# Patient Record
Sex: FEMALE | Race: WHITE | NOT HISPANIC OR LATINO | Employment: FULL TIME | URBAN - NONMETROPOLITAN AREA
[De-identification: names, ages, dates, MRNs, and addresses within clinical notes are randomized per-mention and may not be internally consistent; named-entity substitution may affect disease eponyms.]

---

## 2019-02-01 ENCOUNTER — NON-PROVIDER VISIT (OUTPATIENT)
Dept: URGENT CARE | Facility: PHYSICIAN GROUP | Age: 29
End: 2019-02-01
Payer: OTHER GOVERNMENT

## 2019-02-01 DIAGNOSIS — Z02.1 PRE-EMPLOYMENT DRUG SCREENING: ICD-10-CM

## 2019-02-01 PROCEDURE — 7503 PR ESCREEN ACCT UDS COL ONLY: Performed by: PHYSICIAN ASSISTANT

## 2019-06-13 ENCOUNTER — OFFICE VISIT (OUTPATIENT)
Dept: URGENT CARE | Facility: PHYSICIAN GROUP | Age: 29
End: 2019-06-13
Payer: OTHER GOVERNMENT

## 2019-06-13 VITALS
SYSTOLIC BLOOD PRESSURE: 122 MMHG | OXYGEN SATURATION: 97 % | WEIGHT: 220 LBS | TEMPERATURE: 98.2 F | HEART RATE: 92 BPM | BODY MASS INDEX: 33.34 KG/M2 | DIASTOLIC BLOOD PRESSURE: 70 MMHG | HEIGHT: 68 IN | RESPIRATION RATE: 16 BRPM

## 2019-06-13 DIAGNOSIS — L30.9 ECZEMA, UNSPECIFIED TYPE: ICD-10-CM

## 2019-06-13 DIAGNOSIS — E66.9 OBESITY (BMI 30-39.9): ICD-10-CM

## 2019-06-13 PROCEDURE — 99204 OFFICE O/P NEW MOD 45 MIN: CPT | Performed by: PHYSICIAN ASSISTANT

## 2019-06-13 RX ORDER — TRIAMCINOLONE ACETONIDE 1 MG/G
1 CREAM TOPICAL 2 TIMES DAILY
Qty: 1 TUBE | Refills: 0 | Status: SHIPPED | OUTPATIENT
Start: 2019-06-13

## 2019-06-13 NOTE — PROGRESS NOTES
"Chief Complaint   Patient presents with   • Rash     16 weeks pregnant       HISTORY OF PRESENT ILLNESS: Patient is a 29 y.o. female who presents today because she has a rash on her forearms bilaterally.  This just started over the last day or so.  It does have an itchy sensation.  She also notes that she has a small dry patch on her right forearm.    Patient Active Problem List    Diagnosis Date Noted   • Obesity (BMI 30-39.9) 06/13/2019       Allergies:Codeine    Current Outpatient Prescriptions Ordered in [facility]   Medication Sig Dispense Refill   • triamcinolone acetonide (KENALOG) 0.1 % Cream Apply 1 Application to affected area(s) 2 times a day. 1 Tube 0     No current Epic-ordered facility-administered medications on file.        No past medical history on file.    Social History   Substance Use Topics   • Smoking status: Former Smoker   • Smokeless tobacco: Never Used   • Alcohol use Not on file       No family status information on file.   No family history on file.    ROS:  Review of Systems   Constitutional: Negative for fever, chills, weight loss and malaise/fatigue.   HENT: Negative for ear pain, nosebleeds, congestion, sore throat and neck pain.    Eyes: Negative for blurred vision.   Respiratory: Negative for cough, sputum production, shortness of breath and wheezing.    Cardiovascular: Negative for chest pain, palpitations, orthopnea and leg swelling.   Gastrointestinal: Negative for heartburn, nausea, vomiting and abdominal pain.   Genitourinary: Negative for dysuria, urgency and frequency.     Exam:  /70 (BP Location: Right arm, Patient Position: Sitting, BP Cuff Size: Large adult)   Pulse 92   Temp 36.8 °C (98.2 °F) (Temporal)   Resp 16   Ht 1.715 m (5' 7.5\")   Wt 99.8 kg (220 lb)   SpO2 97%   General:  Well nourished, well developed female in NAD  Head:Normocephalic, atraumatic  Eyes: PERRLA, EOM within normal limits, no conjunctival injection, no scleral icterus, visual fields and " acuity grossly intact.  Nose: Symmetrical without tenderness, no discharge.  Mouth: reasonable hygiene, no erythema exudates or tonsillar enlargement.  Neck: no masses, range of motion within normal limits, no tracheal deviation. No obvious thyroid enlargement.  Extremities: no clubbing, cyanosis, or edema.  Skin: On her forearms bilaterally there is a blanching mildly erythematous rash.  Nummular appearing lesion with dry skin by her medial epicondyle    Please note that this dictation was created using voice recognition software. I have made every reasonable attempt to correct obvious errors, but I expect that there are errors of grammar and possibly content that I did not discover before finalizing the note.    Assessment/Plan:  1. Eczema, unspecified type  triamcinolone acetonide (KENALOG) 0.1 % Cream   2. Obesity (BMI 30-39.9)  Patient identified as having weight management issue.  Appropriate orders and counseling given.   Recommended over-the-counter Eucerin cream daily.    Followup with primary care in the next 7-10 days if not significantly improving, return to the urgent care or go to the emergency room sooner for any worsening of symptoms.

## 2020-06-27 ENCOUNTER — OFFICE VISIT (OUTPATIENT)
Dept: URGENT CARE | Facility: PHYSICIAN GROUP | Age: 30
End: 2020-06-27
Payer: OTHER GOVERNMENT

## 2020-06-27 VITALS
SYSTOLIC BLOOD PRESSURE: 120 MMHG | HEART RATE: 86 BPM | OXYGEN SATURATION: 98 % | WEIGHT: 215 LBS | RESPIRATION RATE: 16 BRPM | TEMPERATURE: 97.3 F | DIASTOLIC BLOOD PRESSURE: 74 MMHG | BODY MASS INDEX: 33.18 KG/M2

## 2020-06-27 DIAGNOSIS — S90.121A CONTUSION OF LESSER TOE OF RIGHT FOOT WITHOUT DAMAGE TO NAIL, INITIAL ENCOUNTER: ICD-10-CM

## 2020-06-27 DIAGNOSIS — S90.31XA CONTUSION OF RIGHT FOOT, INITIAL ENCOUNTER: ICD-10-CM

## 2020-06-27 PROCEDURE — 99203 OFFICE O/P NEW LOW 30 MIN: CPT | Performed by: INTERNAL MEDICINE

## 2020-06-27 ASSESSMENT — ENCOUNTER SYMPTOMS
NUMBNESS: 0
JOINT SWELLING: 1
WEAKNESS: 0

## 2020-06-27 ASSESSMENT — PAIN SCALES - GENERAL: PAINLEVEL: 5=MODERATE PAIN

## 2020-06-27 NOTE — PROGRESS NOTES
Subjective:     Quita Gordon is a 30 y.o. female who presents for Toe Injury (R pinky toe pain got injured 3d ago then she hurt it again yesteday)  Patient complains of accidentally bumped her right little toe on a table 3 days ago and it was getting better and then yesterday again she accidentally hit a object and reinjured her right little toe and this time there was more ecchymosis, more pain and swelling in that skin involving the dorsum of the foot.    Able to walk and its mildly painful and no other injuries     Toe Injury   This is a new problem. The current episode started in the past 7 days. The problem occurs constantly. The problem has been gradually worsening. Associated symptoms include joint swelling. Pertinent negatives include no numbness or weakness.   History reviewed. No pertinent past medical history.History reviewed. No pertinent surgical history.  Social History     Socioeconomic History   • Marital status:      Spouse name: Not on file   • Number of children: Not on file   • Years of education: Not on file   • Highest education level: Not on file   Occupational History   • Not on file   Social Needs   • Financial resource strain: Not on file   • Food insecurity     Worry: Not on file     Inability: Not on file   • Transportation needs     Medical: Not on file     Non-medical: Not on file   Tobacco Use   • Smoking status: Former Smoker   • Smokeless tobacco: Never Used   Substance and Sexual Activity   • Alcohol use: Not on file   • Drug use: Not on file   • Sexual activity: Not on file   Lifestyle   • Physical activity     Days per week: Not on file     Minutes per session: Not on file   • Stress: Not on file   Relationships   • Social connections     Talks on phone: Not on file     Gets together: Not on file     Attends [employer] service: Not on file     Active member of club or organization: Not on file     Attends meetings of clubs or organizations: Not on file     Relationship  status: Not on file   • Intimate partner violence     Fear of current or ex partner: Not on file     Emotionally abused: Not on file     Physically abused: Not on file     Forced sexual activity: Not on file   Other Topics Concern   • Not on file   Social History Narrative   • Not on file    History reviewed. No pertinent family history. Review of Systems   Musculoskeletal: Positive for joint pain and joint swelling.   Neurological: Negative for weakness and numbness.   All other systems reviewed and are negative.    Allergies   Allergen Reactions   • Codeine       Objective:   /74   Pulse 86   Temp 36.3 °C (97.3 °F) (Temporal)   Resp 16   Wt 97.5 kg (215 lb)   SpO2 98%   BMI 33.18 kg/m²   Physical Exam  Vitals signs reviewed.   Constitutional:       General: She is not in acute distress.     Appearance: She is well-developed.   HENT:      Head: Normocephalic and atraumatic.   Eyes:      Conjunctiva/sclera: Conjunctivae normal.   Cardiovascular:      Rate and Rhythm: Normal rate and regular rhythm.   Pulmonary:      Effort: Pulmonary effort is normal.      Breath sounds: Normal breath sounds.   Musculoskeletal:         General: Swelling, tenderness and signs of injury (Right foot-swelling and ecchymosis and tenderness of the fifth toe, with adjacent swelling of the foot) present.   Skin:     General: Skin is warm and dry.   Neurological:      Mental Status: She is alert and oriented to person, place, and time.      Sensory: No sensory deficit.   Psychiatric:         Thought Content: Thought content normal.           Assessment/Plan:   Assessment    1. Contusion of lesser toe of right foot without damage to nail, initial encounter  - DX-FOOT-COMPLETE 3+ RIGHT; Future    2. Contusion of right foot, initial encounter  - DX-FOOT-COMPLETE 3+ RIGHT; Future    Advised patient to take ibuprofen 600 3-4 times a day with food as needed    Patient did not want an x-ray at this time but wanted an order for an x-ray  just in case if not better in few days    Most likely  distal phalanx fracture and the treatment is julisa taping and and will do that today  Differential diagnosis, natural history, supportive care, and indications for immediate follow-up discussed.

## 2021-02-16 ENCOUNTER — OFFICE VISIT (OUTPATIENT)
Dept: URGENT CARE | Facility: PHYSICIAN GROUP | Age: 31
End: 2021-02-16
Payer: OTHER GOVERNMENT

## 2021-02-16 VITALS
RESPIRATION RATE: 18 BRPM | HEIGHT: 68 IN | TEMPERATURE: 97.3 F | WEIGHT: 241 LBS | DIASTOLIC BLOOD PRESSURE: 74 MMHG | HEART RATE: 88 BPM | BODY MASS INDEX: 36.53 KG/M2 | SYSTOLIC BLOOD PRESSURE: 118 MMHG | OXYGEN SATURATION: 97 %

## 2021-02-16 DIAGNOSIS — K29.70 GASTRITIS WITHOUT BLEEDING, UNSPECIFIED CHRONICITY, UNSPECIFIED GASTRITIS TYPE: ICD-10-CM

## 2021-02-16 DIAGNOSIS — R10.9 ABDOMINAL DISCOMFORT: ICD-10-CM

## 2021-02-16 DIAGNOSIS — R19.7 DIARRHEA, UNSPECIFIED TYPE: ICD-10-CM

## 2021-02-16 DIAGNOSIS — R11.0 NAUSEA: ICD-10-CM

## 2021-02-16 LAB
APPEARANCE UR: CLEAR
BILIRUB UR STRIP-MCNC: NORMAL MG/DL
COLOR UR AUTO: NORMAL
GLUCOSE UR STRIP.AUTO-MCNC: NORMAL MG/DL
INT CON NEG: NEGATIVE
INT CON POS: POSITIVE
KETONES UR STRIP.AUTO-MCNC: NORMAL MG/DL
LEUKOCYTE ESTERASE UR QL STRIP.AUTO: NORMAL
NITRITE UR QL STRIP.AUTO: NORMAL
PH UR STRIP.AUTO: 7 [PH] (ref 5–8)
POC URINE PREGNANCY TEST: NEGATIVE
PROT UR QL STRIP: NORMAL MG/DL
RBC UR QL AUTO: NORMAL
SP GR UR STRIP.AUTO: 1.01
UROBILINOGEN UR STRIP-MCNC: NORMAL MG/DL

## 2021-02-16 PROCEDURE — 99214 OFFICE O/P EST MOD 30 MIN: CPT | Performed by: PHYSICIAN ASSISTANT

## 2021-02-16 PROCEDURE — 81002 URINALYSIS NONAUTO W/O SCOPE: CPT | Performed by: PHYSICIAN ASSISTANT

## 2021-02-16 PROCEDURE — 81025 URINE PREGNANCY TEST: CPT | Performed by: PHYSICIAN ASSISTANT

## 2021-02-16 RX ORDER — DESOGESTREL AND ETHINYL ESTRADIOL 0.15-0.03
KIT ORAL
COMMUNITY
Start: 2020-12-28

## 2021-02-16 RX ORDER — OMEPRAZOLE 20 MG/1
20 CAPSULE, DELAYED RELEASE ORAL DAILY
Qty: 30 CAPSULE | Refills: 0 | Status: SHIPPED | OUTPATIENT
Start: 2021-02-16

## 2021-02-16 RX ORDER — AMOXICILLIN 500 MG/1
CAPSULE ORAL
COMMUNITY
Start: 2020-12-02

## 2021-02-16 NOTE — PROGRESS NOTES
Chief Complaint   Patient presents with   • Abdominal Pain     nausea, diarrhea        HISTORY OF PRESENT ILLNESS: Patient is a 30 y.o. female who presents today because she has fairly long history of abdominal discomfort and is currently being worked up by GI.  Infectious or GI yesterday who ordered labs, ultrasound, is setting her up for a scope.  She missed work today because she was having upper abdominal pain and needs a note in order to return to work.  She reports occasional acid reflux symptoms 2-3 times weekly that has been going on for quite some time but has not been taking any medications for.  Reports diarrhea without blood    Patient Active Problem List    Diagnosis Date Noted   • Obesity (BMI 30-39.9) 06/13/2019       Allergies:Codeine    Current Outpatient Medications Ordered in Epic   Medication Sig Dispense Refill   • omeprazole (PRILOSEC) 20 MG delayed-release capsule Take 1 capsule by mouth every day. 30 capsule 0   • EMOQUETTE 0.15-30 MG-MCG per tablet      • amoxicillin (AMOXIL) 500 MG Cap      • triamcinolone acetonide (KENALOG) 0.1 % Cream Apply 1 Application to affected area(s) 2 times a day. (Patient not taking: Reported on 6/27/2020) 1 Tube 0     No current Epic-ordered facility-administered medications on file.       History reviewed. No pertinent past medical history.    Social History     Tobacco Use   • Smoking status: Former Smoker   • Smokeless tobacco: Never Used   Substance Use Topics   • Alcohol use: Not on file   • Drug use: Not on file       No family status information on file.   History reviewed. No pertinent family history.    ROS:  Review of Systems   Constitutional: Negative for fever, chills, weight loss and malaise/fatigue.   HENT: Negative for ear pain, nosebleeds, congestion, sore throat and neck pain.    Eyes: Negative for blurred vision.   Respiratory: Negative for cough, sputum production, shortness of breath and wheezing.    Cardiovascular: Negative for chest pain,  "palpitations, orthopnea and leg swelling.   Gastrointestinal: Positive for heartburn, nausea, vomiting and abdominal pain.   Genitourinary: Negative for dysuria, urgency and frequency.     Exam:  /74 (BP Location: Right arm, Patient Position: Sitting, BP Cuff Size: Large adult)   Pulse 88   Temp 36.3 °C (97.3 °F) (Temporal)   Resp 18   Ht 1.715 m (5' 7.5\")   Wt 109 kg (241 lb)   SpO2 97%   General:  Well nourished, well developed female in NAD  Head:Normocephalic, atraumatic  Eyes: PERRLA, EOM within normal limits, no conjunctival injection, no scleral icterus, visual fields and acuity grossly intact.  Nose: Symmetrical without tenderness, no discharge.  Mouth: reasonable hygiene, no erythema exudates or tonsillar enlargement.  Neck: no masses, range of motion within normal limits, no tracheal deviation. No obvious thyroid enlargement.  Pulmonary: chest is symmetrical with respiration, no wheezes, crackles, or rhonchi.  Cardiovascular: regular rate and rhythm without murmurs, rubs, or gallops.  Extremities: no clubbing, cyanosis, or edema.    hCG negative, urinalysis unremarkable.    Please note that this dictation was created using voice recognition software. I have made every reasonable attempt to correct obvious errors, but I expect that there are errors of grammar and possibly content that I did not discover before finalizing the note.    Assessment/Plan:  1. Nausea  POCT Urinalysis    POCT Pregnancy   2. Diarrhea, unspecified type     3. Abdominal discomfort  POCT Urinalysis    POCT Pregnancy   4. Gastritis without bleeding, unspecified chronicity, unspecified gastritis type  omeprazole (PRILOSEC) 20 MG delayed-release capsule   Continue with GI.    Followup with primary care in the next 7-10 days if not significantly improving, return to the urgent care or go to the emergency room sooner for any worsening of symptoms.       "

## 2021-02-16 NOTE — LETTER
February 16, 2021         Patient: Quita Gordon   YOB: 1990   Date of Visit: 2/16/2021           To Whom it May Concern:    Quita Gordon was seen in my clinic on 2/16/2021. She may return to work on 2/17/2021, please excuse any recent absences.    If you have any questions or concerns, please don't hesitate to call.        Sincerely,           Rey Belcher P.A.-C.  Electronically Signed

## 2021-06-28 ENCOUNTER — HOSPITAL ENCOUNTER (OUTPATIENT)
Facility: MEDICAL CENTER | Age: 31
End: 2021-06-28
Attending: PHYSICIAN ASSISTANT
Payer: OTHER GOVERNMENT

## 2021-06-28 ENCOUNTER — OFFICE VISIT (OUTPATIENT)
Dept: URGENT CARE | Facility: PHYSICIAN GROUP | Age: 31
End: 2021-06-28
Payer: OTHER GOVERNMENT

## 2021-06-28 VITALS
HEIGHT: 68 IN | BODY MASS INDEX: 36.37 KG/M2 | OXYGEN SATURATION: 99 % | HEART RATE: 111 BPM | WEIGHT: 240 LBS | SYSTOLIC BLOOD PRESSURE: 108 MMHG | DIASTOLIC BLOOD PRESSURE: 70 MMHG | TEMPERATURE: 99.2 F

## 2021-06-28 DIAGNOSIS — J06.9 UPPER RESPIRATORY TRACT INFECTION, UNSPECIFIED TYPE: ICD-10-CM

## 2021-06-28 PROCEDURE — U0003 INFECTIOUS AGENT DETECTION BY NUCLEIC ACID (DNA OR RNA); SEVERE ACUTE RESPIRATORY SYNDROME CORONAVIRUS 2 (SARS-COV-2) (CORONAVIRUS DISEASE [COVID-19]), AMPLIFIED PROBE TECHNIQUE, MAKING USE OF HIGH THROUGHPUT TECHNOLOGIES AS DESCRIBED BY CMS-2020-01-R: HCPCS

## 2021-06-28 PROCEDURE — 99214 OFFICE O/P EST MOD 30 MIN: CPT | Performed by: PHYSICIAN ASSISTANT

## 2021-06-28 PROCEDURE — U0005 INFEC AGEN DETEC AMPLI PROBE: HCPCS

## 2021-06-28 RX ORDER — FLUTICASONE PROPIONATE 50 MCG
SPRAY, SUSPENSION (ML) NASAL
COMMUNITY
Start: 2021-04-07

## 2021-06-28 RX ORDER — GUAIFENESIN AND PSEUDOEPHEDRINE HYDROCHLORIDE 600; 60 MG/1; MG/1
TABLET, EXTENDED RELEASE ORAL
COMMUNITY
Start: 2021-04-07

## 2021-06-28 RX ORDER — FEXOFENADINE HCL 60 MG/1
TABLET, FILM COATED ORAL
COMMUNITY
Start: 2021-04-14

## 2021-06-28 NOTE — PROGRESS NOTES
Chief Complaint   Patient presents with   • Nasal Congestion     x 2 days sinus pressure and pain, sore throat        HISTORY OF PRESENT ILLNESS: Patient is a 31 y.o. female who presents today because she has a 2-day history of nasal congestion, sore throat, mild fever, headache, fatigue.  She has not been taking any medications for her symptoms because she is pregnant    Patient Active Problem List    Diagnosis Date Noted   • Obesity (BMI 30-39.9) 06/13/2019       Allergies:Codeine    Current Outpatient Medications Ordered in Epic   Medication Sig Dispense Refill   • fexofenadine (ALLEGRA) 60 MG Tab  (Patient not taking: Reported on 6/28/2021)     • fluticasone (FLONASE) 50 MCG/ACT nasal spray  (Patient not taking: Reported on 6/28/2021)     • MUCINEX D  MG per tablet  (Patient not taking: Reported on 6/28/2021)     • EMOQUETTE 0.15-30 MG-MCG per tablet  (Patient not taking: Reported on 6/28/2021)     • amoxicillin (AMOXIL) 500 MG Cap  (Patient not taking: Reported on 6/28/2021)     • omeprazole (PRILOSEC) 20 MG delayed-release capsule Take 1 capsule by mouth every day. (Patient not taking: Reported on 6/28/2021) 30 capsule 0   • triamcinolone acetonide (KENALOG) 0.1 % Cream Apply 1 Application to affected area(s) 2 times a day. (Patient not taking: Reported on 6/27/2020) 1 Tube 0     No current Epic-ordered facility-administered medications on file.       History reviewed. No pertinent past medical history.    Social History     Tobacco Use   • Smoking status: Former Smoker   • Smokeless tobacco: Never Used   Substance Use Topics   • Alcohol use: Not on file   • Drug use: Not on file       No family status information on file.   History reviewed. No pertinent family history.    ROS:  Review of Systems   Constitutional: Positive for fever, chills, myalgias and malaise/fatigue.   HENT: Negative for ear pain, nosebleeds, positive for nasal congestion, sore throat and no neck pain.    Eyes: Negative for blurred  "vision.   Respiratory: Positive for mild cough, no sputum production, shortness of breath and wheezing.    Cardiovascular: Negative for chest pain, palpitations, orthopnea and leg swelling.   Gastrointestinal: Negative for heartburn, nausea, vomiting and abdominal pain.   Genitourinary: Negative for dysuria, urgency and frequency.     Exam:  /70 (BP Location: Left arm, Patient Position: Sitting, BP Cuff Size: Large adult)   Pulse (!) 111   Temp 37.3 °C (99.2 °F) (Temporal)   Ht 1.715 m (5' 7.5\")   Wt 109 kg (240 lb)   SpO2 99%   General:  Well nourished, well developed female in NAD  Head:Normocephalic, atraumatic  Eyes: PERRLA, EOM within normal limits, no conjunctival injection, no scleral icterus, visual fields and acuity grossly intact.  Ears: Normal shape and symmetry, no tenderness, no discharge. External canals are without any significant edema or erythema. Tympanic membranes are without any inflammation, no effusion. Gross auditory acuity is intact  Nose: Symmetrical without tenderness, no discharge.  Mouth: reasonable hygiene, no erythema exudates or tonsillar enlargement.  Neck: no masses, range of motion within normal limits, no tracheal deviation. No obvious thyroid enlargement.  Pulmonary: chest is symmetrical with respiration, no wheezes, crackles, or rhonchi.  Cardiovascular: regular rate and rhythm without murmurs, rubs, or gallops.  Extremities: no clubbing, cyanosis, or edema.    Please note that this dictation was created using voice recognition software. I have made every reasonable attempt to correct obvious errors, but I expect that there are errors of grammar and possibly content that I did not discover before finalizing the note.    Assessment/Plan:  1. Upper respiratory tract infection, unspecified type  SARS-CoV-2 PCR (24 hour In-House): Collect NP swab in VTM   Discussed strict isolation until Covid test returns, over-the-counter symptomatic relief as needed    Followup with " primary care in the next 7-10 days if not significantly improving, return to the urgent care or go to the emergency room sooner for any worsening of symptoms.

## 2021-06-29 ENCOUNTER — TELEPHONE (OUTPATIENT)
Dept: URGENT CARE | Facility: PHYSICIAN GROUP | Age: 31
End: 2021-06-29

## 2021-06-29 LAB
COVID ORDER STATUS COVID19: NORMAL
SARS-COV-2 RNA RESP QL NAA+PROBE: NOTDETECTED
SPECIMEN SOURCE: NORMAL

## 2021-06-30 NOTE — TELEPHONE ENCOUNTER
----- Message from Rey Belcher P.A.-C. sent at 6/29/2021  4:21 PM PDT -----  Please notify patient Covid test was negative